# Patient Record
Sex: MALE | Race: WHITE | NOT HISPANIC OR LATINO | Employment: FULL TIME | URBAN - METROPOLITAN AREA
[De-identification: names, ages, dates, MRNs, and addresses within clinical notes are randomized per-mention and may not be internally consistent; named-entity substitution may affect disease eponyms.]

---

## 2023-01-29 PROBLEM — R06.09 DYSPNEA ON EXERTION: Status: ACTIVE | Noted: 2023-01-29

## 2023-01-29 PROBLEM — R07.9 INTERMITTENT CHEST PAIN: Status: ACTIVE | Noted: 2023-01-29

## 2023-01-29 PROBLEM — R40.0 DAYTIME SOMNOLENCE: Status: ACTIVE | Noted: 2023-01-29

## 2023-01-29 PROBLEM — F32.A DEPRESSION: Status: ACTIVE | Noted: 2023-01-29

## 2023-01-29 PROBLEM — K62.5 RECTAL BLEED: Status: ACTIVE | Noted: 2023-01-29

## 2023-01-29 PROBLEM — M79.10 GENERALIZED MUSCLE ACHE: Status: ACTIVE | Noted: 2023-01-29

## 2023-01-29 PROBLEM — E78.5 HYPERLIPIDEMIA: Status: ACTIVE | Noted: 2023-01-29

## 2023-01-29 PROBLEM — R45.851 SUICIDAL IDEATIONS: Status: ACTIVE | Noted: 2023-01-29

## 2023-01-29 RX ORDER — PHENYLPROPANOLAMINE/CLEMASTINE 75-1.34MG
TABLET, EXTENDED RELEASE ORAL
COMMUNITY
Start: 2022-05-09

## 2023-01-29 RX ORDER — ESCITALOPRAM OXALATE 20 MG/1
1 TABLET ORAL DAILY
COMMUNITY
Start: 2022-05-09 | End: 2023-04-13 | Stop reason: SDUPTHER

## 2023-03-13 ENCOUNTER — OFFICE VISIT (OUTPATIENT)
Dept: PRIMARY CARE | Facility: CLINIC | Age: 28
End: 2023-03-13
Payer: COMMERCIAL

## 2023-03-13 VITALS
SYSTOLIC BLOOD PRESSURE: 122 MMHG | WEIGHT: 201 LBS | OXYGEN SATURATION: 97 % | HEIGHT: 68 IN | DIASTOLIC BLOOD PRESSURE: 80 MMHG | BODY MASS INDEX: 30.46 KG/M2 | HEART RATE: 65 BPM

## 2023-03-13 DIAGNOSIS — Z30.09 VASECTOMY EVALUATION: Primary | ICD-10-CM

## 2023-03-13 DIAGNOSIS — Z00.00 HEALTHCARE MAINTENANCE: ICD-10-CM

## 2023-03-13 DIAGNOSIS — F32.A DEPRESSION, UNSPECIFIED DEPRESSION TYPE: ICD-10-CM

## 2023-03-13 PROCEDURE — 99214 OFFICE O/P EST MOD 30 MIN: CPT | Performed by: INTERNAL MEDICINE

## 2023-03-13 RX ORDER — HYDROCORTISONE 25 MG/G
CREAM TOPICAL
COMMUNITY
Start: 2022-05-09 | End: 2023-11-10 | Stop reason: SDUPTHER

## 2023-03-13 RX ORDER — HYDROCORTISONE ACETATE 25 MG/1
SUPPOSITORY RECTAL
COMMUNITY
Start: 2022-08-08 | End: 2023-10-12 | Stop reason: SDUPTHER

## 2023-03-13 NOTE — PROGRESS NOTES
"Subjective   Patient ID: Akira Matthews is a 28 y.o. male who presents for Follow-up.  HPI  Patient states he overall feels well no active issues or complaints he states the Lexapro 20 is actually doing very well for him he feels great no side effects he is very happy with this medicine              Health Maintenance:      Colonoscopy:      Mammogram:      Pelvic/Pap:      Low dose chest CT:      Aorta duplex:      Optho:      Podiatry:        Vaccines:      Prevnar 20:      Prevnar 13:      Pneumovax 23:      Tdap:      Shingrix:      COVID:      Influenza:        ROS:      General: denies fever/chills/weight loss      Head: denies HA/trauma/masses/dizziness      Eyes: denies vision change/loss of vision/blurry vision/diplopia/eye pain      Ears: denies hearing loss/tinnitus/otalgia/otorrhea      Nose: denies nasal drainage/anosmia      Throat: denies dysphagia/odynophagia      Lymphatics: denies lymph node swelling      Cardiac: denies CP/palpitations/orthopnea/PND      Pulmonary: denies dyspnea/cough/wheezing      GI: denies abd pain/n/v/diarrhea/melena/hematochezia/hematemesis      : Would like a vasectomy does not want kids with his wife denies dysuria/hematuria/change frequency      Genital: denies genital discharge/lesions      Skin: denies rashes/lesions/masses      MSK: denies weakness/swelling/edema/gait imbalance/pain      Neuro: denies paresthesias/seizures/dysarthria      Psych: denies depression/anxiety/suicidal or homicidal ideations            Objective   /80   Pulse 65   Ht 1.727 m (5' 8\")   Wt 91.2 kg (201 lb)   SpO2 97%   BMI 30.56 kg/m²      Physical Exam:     General: AO3, NAD     Head: atraumatic/NC     Eyes: EOMI/PERRLA. Negative APD     Ears: TM pearly gray, EAC clear. No lesions or erythema     Nose: symmetric nares, no discharge     Throat: trachea midline, uvula midline pink mucosa. No thyromegaly     Lymphatics: no cervical/supraclavicular/ant or posterior cervical " adenopathy/axillary/inguinal adenopathy     Breast: not examined     Chest: no deformity or tenderness to palpation     Pulm: CTA b/l, no wheeze/rhonchi/rales. nonlabored     Cardiac: RRR +s1s2, no m/r/g.      GI: soft, NT/ND. Normoactive Bsx4. No rebound/guarding.     Rectal: no examined     MSK: 5/5 strength UE LE. No edema/clubbing/cyanosis     Skin: no rashes/lesions     Vascular: 2+ palp DP PT radials b/l. Negative carotid bruit     Neuro: CNII-XII intact. No focal deficits. Reflexes 2/4 brachioradialis bicep tricep patellar achilles. Finger to nose intact.     Psych: appropriate mood/affect                    No results found for: BMPR1A, CBCDIF      Assessment/Plan   Diagnoses and all orders for this visit:  Vasectomy evaluation  -     Referral to Urology; Future  Healthcare maintenance  -     CBC and Auto Differential; Future  -     Comprehensive Metabolic Panel; Future  -     Hemoglobin A1C; Future  -     Lipid Panel; Future  -     T4, free; Future  -     TSH; Future  Depression, unspecified depression type    Thank you for making appointment today Akira Weldon follow-up in 6 months       Grace Haywood MA

## 2023-04-13 DIAGNOSIS — F32.A DEPRESSION, UNSPECIFIED DEPRESSION TYPE: Primary | ICD-10-CM

## 2023-04-13 RX ORDER — ESCITALOPRAM OXALATE 20 MG/1
20 TABLET ORAL DAILY
Qty: 90 TABLET | Refills: 1 | Status: SHIPPED | OUTPATIENT
Start: 2023-04-13 | End: 2023-04-27

## 2023-04-27 DIAGNOSIS — F32.A DEPRESSION, UNSPECIFIED DEPRESSION TYPE: ICD-10-CM

## 2023-04-27 RX ORDER — ESCITALOPRAM OXALATE 20 MG/1
TABLET ORAL
Qty: 90 TABLET | Refills: 1 | Status: SHIPPED | OUTPATIENT
Start: 2023-04-27 | End: 2023-10-12 | Stop reason: SDUPTHER

## 2023-07-19 LAB
ALANINE AMINOTRANSFERASE (SGPT) (U/L) IN SER/PLAS: 44 U/L (ref 10–52)
ALBUMIN (G/DL) IN SER/PLAS: 4.6 G/DL (ref 3.4–5)
ALKALINE PHOSPHATASE (U/L) IN SER/PLAS: 81 U/L (ref 33–120)
ANION GAP IN SER/PLAS: 14 MMOL/L (ref 10–20)
ASPARTATE AMINOTRANSFERASE (SGOT) (U/L) IN SER/PLAS: 24 U/L (ref 9–39)
BILIRUBIN TOTAL (MG/DL) IN SER/PLAS: 1.1 MG/DL (ref 0–1.2)
C REACTIVE PROTEIN (MG/L) IN SER/PLAS: 0.28 MG/DL
CALCIUM (MG/DL) IN SER/PLAS: 10.1 MG/DL (ref 8.6–10.3)
CARBON DIOXIDE, TOTAL (MMOL/L) IN SER/PLAS: 22 MMOL/L (ref 21–32)
CHLORIDE (MMOL/L) IN SER/PLAS: 105 MMOL/L (ref 98–107)
CREATININE (MG/DL) IN SER/PLAS: 0.78 MG/DL (ref 0.5–1.3)
ERYTHROCYTE DISTRIBUTION WIDTH (RATIO) BY AUTOMATED COUNT: 12.6 % (ref 11.5–14.5)
ERYTHROCYTE MEAN CORPUSCULAR HEMOGLOBIN CONCENTRATION (G/DL) BY AUTOMATED: 35.1 G/DL (ref 32–36)
ERYTHROCYTE MEAN CORPUSCULAR VOLUME (FL) BY AUTOMATED COUNT: 90 FL (ref 80–100)
ERYTHROCYTES (10*6/UL) IN BLOOD BY AUTOMATED COUNT: 5.23 X10E12/L (ref 4.5–5.9)
GFR MALE: >90 ML/MIN/1.73M2
GLUCOSE (MG/DL) IN SER/PLAS: 102 MG/DL (ref 74–99)
HEMATOCRIT (%) IN BLOOD BY AUTOMATED COUNT: 47 % (ref 41–52)
HEMOGLOBIN (G/DL) IN BLOOD: 16.5 G/DL (ref 13.5–17.5)
LEUKOCYTES (10*3/UL) IN BLOOD BY AUTOMATED COUNT: 7 X10E9/L (ref 4.4–11.3)
PLATELETS (10*3/UL) IN BLOOD AUTOMATED COUNT: 298 X10E9/L (ref 150–450)
POTASSIUM (MMOL/L) IN SER/PLAS: 3.6 MMOL/L (ref 3.5–5.3)
PROTEIN TOTAL: 7.5 G/DL (ref 6.4–8.2)
SODIUM (MMOL/L) IN SER/PLAS: 137 MMOL/L (ref 136–145)
THYROTROPIN (MIU/L) IN SER/PLAS BY DETECTION LIMIT <= 0.05 MIU/L: 1.15 MIU/L (ref 0.44–3.98)
UREA NITROGEN (MG/DL) IN SER/PLAS: 10 MG/DL (ref 6–23)

## 2023-07-20 LAB
HEPATITIS A TOTAL AB INTERPRETATION: REACTIVE
HEPATITIS B VIRUS CORE AB (PRESENCE) IN SER/PLAS BY IMM: NONREACTIVE
HEPATITIS B VIRUS SURFACE AB (MIU/ML) IN SERUM: <3.1 MIU/ML
HEPATITIS B VIRUS SURFACE AG PRESENCE IN SERUM: NONREACTIVE
HEPATITIS C VIRUS AB PRESENCE IN SERUM: NONREACTIVE
TISSUE TRANSGLUTAMINASE, IGA: <1 U/ML (ref 0–14)

## 2023-07-24 LAB
C. DIFFICILE TOXIN, PCR: NORMAL
CLOSTRIDIUM DIFFICILE NAP 1 STRAIN (PRESUMPTIVE): NORMAL

## 2023-07-25 LAB
CAMPYLOBACTER GP: NOT DETECTED
NOROVIRUS GI/GII: NOT DETECTED
ROTAVIRUS A: NOT DETECTED
SALMONELLA SP.: NOT DETECTED
SHIGA TOXIN 1: NOT DETECTED
SHIGA TOXIN 2: NOT DETECTED
SHIGELLA SP.: NOT DETECTED
VIBRIO GRP.: NOT DETECTED
YERSINIA ENTEROCOLITICA: NOT DETECTED

## 2023-07-27 LAB — CALPROTECTIN, STOOL: 48 UG/G

## 2023-07-31 LAB
CRYPTOSPORIDIUM ANTIGEN-DATA CONVERSION: NEGATIVE
GIARDIA LAMBLIA AG-DATA CONVERSION: NEGATIVE
OVA + PARASITE EXAM: NEGATIVE

## 2023-09-18 ENCOUNTER — APPOINTMENT (OUTPATIENT)
Dept: PRIMARY CARE | Facility: CLINIC | Age: 28
End: 2023-09-18
Payer: COMMERCIAL

## 2023-10-12 DIAGNOSIS — F32.A DEPRESSION, UNSPECIFIED DEPRESSION TYPE: ICD-10-CM

## 2023-10-12 DIAGNOSIS — K64.9 HEMORRHOIDS, UNSPECIFIED HEMORRHOID TYPE: Primary | ICD-10-CM

## 2023-10-16 RX ORDER — HYDROCORTISONE ACETATE 25 MG/1
SUPPOSITORY RECTAL
Qty: 12 SUPPOSITORY | Refills: 0 | Status: SHIPPED | OUTPATIENT
Start: 2023-10-16

## 2023-10-16 RX ORDER — ESCITALOPRAM OXALATE 20 MG/1
20 TABLET ORAL DAILY
Qty: 30 TABLET | Refills: 0 | Status: SHIPPED | OUTPATIENT
Start: 2023-10-16 | End: 2023-11-10 | Stop reason: SDUPTHER

## 2023-11-09 PROBLEM — Z98.52 STATUS POST VASECTOMY: Status: RESOLVED | Noted: 2023-11-09 | Resolved: 2023-11-09

## 2023-11-09 PROBLEM — K52.9 CHRONIC DIARRHEA: Status: ACTIVE | Noted: 2023-11-09

## 2023-11-10 ENCOUNTER — LAB (OUTPATIENT)
Dept: LAB | Facility: LAB | Age: 28
End: 2023-11-10
Payer: COMMERCIAL

## 2023-11-10 ENCOUNTER — OFFICE VISIT (OUTPATIENT)
Dept: PRIMARY CARE | Facility: CLINIC | Age: 28
End: 2023-11-10
Payer: COMMERCIAL

## 2023-11-10 VITALS
DIASTOLIC BLOOD PRESSURE: 86 MMHG | SYSTOLIC BLOOD PRESSURE: 120 MMHG | BODY MASS INDEX: 30.62 KG/M2 | WEIGHT: 202 LBS | HEIGHT: 68 IN

## 2023-11-10 DIAGNOSIS — D23.5 DERMOID CYST OF SKIN OF BACK: ICD-10-CM

## 2023-11-10 DIAGNOSIS — R19.5 LOOSE STOOLS: ICD-10-CM

## 2023-11-10 DIAGNOSIS — Z00.00 HEALTHCARE MAINTENANCE: ICD-10-CM

## 2023-11-10 DIAGNOSIS — S02.5XXS CLOSED FRACTURE OF TOOTH, SEQUELA: ICD-10-CM

## 2023-11-10 DIAGNOSIS — Z00.00 HEALTHCARE MAINTENANCE: Primary | ICD-10-CM

## 2023-11-10 DIAGNOSIS — F32.A DEPRESSION, UNSPECIFIED DEPRESSION TYPE: ICD-10-CM

## 2023-11-10 LAB
ALBUMIN SERPL BCP-MCNC: 4.7 G/DL (ref 3.4–5)
ALP SERPL-CCNC: 92 U/L (ref 33–120)
ALT SERPL W P-5'-P-CCNC: 58 U/L (ref 10–52)
ANION GAP SERPL CALC-SCNC: 13 MMOL/L (ref 10–20)
AST SERPL W P-5'-P-CCNC: 29 U/L (ref 9–39)
BASOPHILS # BLD AUTO: 0.05 X10*3/UL (ref 0–0.1)
BASOPHILS NFR BLD AUTO: 0.6 %
BILIRUB SERPL-MCNC: 0.9 MG/DL (ref 0–1.2)
BUN SERPL-MCNC: 12 MG/DL (ref 6–23)
CALCIUM SERPL-MCNC: 10.5 MG/DL (ref 8.6–10.6)
CHLORIDE SERPL-SCNC: 103 MMOL/L (ref 98–107)
CHOLEST SERPL-MCNC: 216 MG/DL (ref 0–199)
CHOLESTEROL/HDL RATIO: 5.1
CO2 SERPL-SCNC: 25 MMOL/L (ref 21–32)
CREAT SERPL-MCNC: 0.89 MG/DL (ref 0.5–1.3)
EOSINOPHIL # BLD AUTO: 0.23 X10*3/UL (ref 0–0.7)
EOSINOPHIL NFR BLD AUTO: 2.6 %
ERYTHROCYTE [DISTWIDTH] IN BLOOD BY AUTOMATED COUNT: 12.8 % (ref 11.5–14.5)
EST. AVERAGE GLUCOSE BLD GHB EST-MCNC: 94 MG/DL
GFR SERPL CREATININE-BSD FRML MDRD: >90 ML/MIN/1.73M*2
GLUCOSE SERPL-MCNC: 85 MG/DL (ref 74–99)
HBA1C MFR BLD: 4.9 %
HCT VFR BLD AUTO: 47.6 % (ref 41–52)
HDLC SERPL-MCNC: 42.4 MG/DL
HGB BLD-MCNC: 17.1 G/DL (ref 13.5–17.5)
IMM GRANULOCYTES # BLD AUTO: 0.05 X10*3/UL (ref 0–0.7)
IMM GRANULOCYTES NFR BLD AUTO: 0.6 % (ref 0–0.9)
LDLC SERPL CALC-MCNC: 118 MG/DL
LYMPHOCYTES # BLD AUTO: 2.78 X10*3/UL (ref 1.2–4.8)
LYMPHOCYTES NFR BLD AUTO: 31.4 %
MCH RBC QN AUTO: 32.2 PG (ref 26–34)
MCHC RBC AUTO-ENTMCNC: 35.9 G/DL (ref 32–36)
MCV RBC AUTO: 90 FL (ref 80–100)
MONOCYTES # BLD AUTO: 0.63 X10*3/UL (ref 0.1–1)
MONOCYTES NFR BLD AUTO: 7.1 %
NEUTROPHILS # BLD AUTO: 5.1 X10*3/UL (ref 1.2–7.7)
NEUTROPHILS NFR BLD AUTO: 57.7 %
NON HDL CHOLESTEROL: 174 MG/DL (ref 0–149)
NRBC BLD-RTO: 0 /100 WBCS (ref 0–0)
PLATELET # BLD AUTO: 327 X10*3/UL (ref 150–450)
POTASSIUM SERPL-SCNC: 4 MMOL/L (ref 3.5–5.3)
PROT SERPL-MCNC: 7.5 G/DL (ref 6.4–8.2)
RBC # BLD AUTO: 5.31 X10*6/UL (ref 4.5–5.9)
SODIUM SERPL-SCNC: 137 MMOL/L (ref 136–145)
T4 FREE SERPL-MCNC: 1.16 NG/DL (ref 0.78–1.48)
TRIGL SERPL-MCNC: 278 MG/DL (ref 0–149)
TSH SERPL-ACNC: 0.91 MIU/L (ref 0.44–3.98)
VLDL: 56 MG/DL (ref 0–40)
WBC # BLD AUTO: 8.8 X10*3/UL (ref 4.4–11.3)

## 2023-11-10 PROCEDURE — 84443 ASSAY THYROID STIM HORMONE: CPT

## 2023-11-10 PROCEDURE — 80061 LIPID PANEL: CPT

## 2023-11-10 PROCEDURE — 84439 ASSAY OF FREE THYROXINE: CPT

## 2023-11-10 PROCEDURE — 80053 COMPREHEN METABOLIC PANEL: CPT

## 2023-11-10 PROCEDURE — 36415 COLL VENOUS BLD VENIPUNCTURE: CPT

## 2023-11-10 PROCEDURE — 99215 OFFICE O/P EST HI 40 MIN: CPT | Performed by: INTERNAL MEDICINE

## 2023-11-10 PROCEDURE — 83036 HEMOGLOBIN GLYCOSYLATED A1C: CPT

## 2023-11-10 PROCEDURE — 85025 COMPLETE CBC W/AUTO DIFF WBC: CPT

## 2023-11-10 RX ORDER — DICYCLOMINE HYDROCHLORIDE 10 MG/1
10 CAPSULE ORAL DAILY PRN
Qty: 90 CAPSULE | Refills: 1 | Status: SHIPPED | OUTPATIENT
Start: 2023-11-10 | End: 2024-02-08

## 2023-11-10 RX ORDER — METRONIDAZOLE 500 MG/1
500 TABLET ORAL 3 TIMES DAILY
Qty: 21 TABLET | Refills: 2 | Status: SHIPPED | OUTPATIENT
Start: 2023-11-10 | End: 2023-11-17

## 2023-11-10 RX ORDER — ESCITALOPRAM OXALATE 20 MG/1
20 TABLET ORAL DAILY
Qty: 90 TABLET | Refills: 1 | Status: SHIPPED | OUTPATIENT
Start: 2023-11-10 | End: 2024-05-03 | Stop reason: SDUPTHER

## 2023-11-10 RX ORDER — AMOXICILLIN AND CLAVULANATE POTASSIUM 500; 125 MG/1; MG/1
500 TABLET, FILM COATED ORAL 2 TIMES DAILY
Qty: 14 TABLET | Refills: 2 | Status: SHIPPED | OUTPATIENT
Start: 2023-11-10 | End: 2023-11-17

## 2023-11-10 NOTE — PROGRESS NOTES
Subjective   Patient ID: Akira Matthews is a 28 y.o. male who presents for Follow-up and Med Refill.  HPI        past medical history of depression with suicidal ideation, vasectomy hemorrhoids, rectal bleeding internal hemorrhoids, genital herpes, heavy alcohol use, tobacco dependence,generalized muscle ache, and ADHD. Polysubstance abuse methamphetamine heroin     Patient presents with chronic loose stools for 9 months loose browngrade 8 worse since she has been working   area for the last 9 months nothing really makes better  Intermittent hematochezia from a large internal hemorrhoid he states  Denies abdominal pain nausea vomiting fever chills hematemesis  Denies recent travel sick contacts          Health Maintenance:      Colonoscopy: 2023      Mammogram:      Pelvic/Pap:      Low dose chest CT:      Aorta duplex:      Optho:      Podiatry:        Vaccines:      Prevnar 20:      Prevnar 13:      Pneumovax 23:      Tdap:      Shingrix:      COVID:      Influenza:        ROS:      General: denies fever/chills/weight loss      Head: denies HA/trauma/masses/dizziness      Eyes: denies vision change/loss of vision/blurry vision/diplopia/eye pain      Ears: denies hearing loss/tinnitus/otalgia/otorrhea      Nose: denies nasal drainage/anosmia      Throat: Fractured left tooth denies dysphagia/odynophagia      Lymphatics: denies lymph node swelling      Cardiac: denies CP/palpitations/orthopnea/PND      Pulmonary: denies dyspnea/cough/wheezing      GI: Chronic loose brown stools for 9 months denies abd pain/n/v/diarrhea/melena/hematochezia/hematemesis      : denies dysuria/hematuria/change frequency      Genital: denies genital discharge/lesions      Skin: He has a lump on his back since age 12 sometimes pus drains out of it denies rashes/lesions/masses      MSK: denies weakness/swelling/edema/gait imbalance/pain      Neuro: denies paresthesias/seizures/dysarthria      Psych: Some stress because his wife is  "leaving him but he states the Lexapro really helps him 20 mg a day denies depression/anxiety/suicidal or homicidal ideations            Objective   /86   Ht 1.727 m (5' 8\")   Wt 91.6 kg (202 lb)   BMI 30.71 kg/m²      Physical Exam:     General: AO3, NAD     Head: atraumatic/NC     Eyes: EOMI/PERRLA. Negative APD     Ears: TM pearly gray, EAC clear. No lesions or erythema     Nose: symmetric nares, no discharge     Throat: trachea midline, uvula midline pink mucosa. No thyromegaly     Lymphatics: no cervical/supraclavicular/ant or posterior cervical adenopathy/axillary/inguinal adenopathy     Breast: not examined     Chest: no deformity or tenderness to palpation     Pulm: CTA b/l, no wheeze/rhonchi/rales. nonlabored     Cardiac: RRR +s1s2, no m/r/g.      GI: soft, NT/ND. Normoactive Bsx4. No rebound/guarding.     Rectal: no examined     MSK: 5/5 strength UE LE. No edema/clubbing/cyanosis     Skin: Posterior left parathoracic cystic subcutaneous mass 3 cm no fluctuance tenderness drainage warmth no rashes/lesions     Vascular: 2+ palp DP PT radials b/l. Negative carotid bruit     Neuro: CNII-XII intact. No focal deficits. Reflexes 2/4 brachioradialis bicep tricep patellar achilles. Finger to nose intact.     Psych: appropriate mood/affect                    No results found for: \"BMPR1A\", \"CBCDIF\"      Assessment/Plan   Diagnoses and all orders for this visit:  Healthcare maintenance  -     CBC and Auto Differential; Future  -     Comprehensive Metabolic Panel; Future  -     Hemoglobin A1C; Future  -     Lipid Panel; Future  -     TSH with reflex to Free T4 if abnormal; Future  -     Thyroxine, Free; Future  Depression, unspecified depression type  -     escitalopram (Lexapro) 20 mg tablet; Take 1 tablet (20 mg) by mouth once daily.  Loose stools  Comments:  Likely IBS-D rule out other such as bacterial gastroenteritis ova parasite given exposure in the   Orders:  -     dicyclomine (Bentyl) 10 mg " capsule; Take 1 capsule (10 mg) by mouth once daily as needed (loose stools).  -     amoxicillin-pot clavulanate (Augmentin) 500-125 mg tablet; Take 1 tablet (500 mg) by mouth 2 times a day for 7 days.  -     metroNIDAZOLE (Flagyl) 500 mg tablet; Take 1 tablet (500 mg) by mouth 3 times a day for 7 days.  Dermoid cyst of skin of back  Comments:  With intermittent infection  Orders:  -     Referral to General Surgery; Future  Closed fracture of tooth, sequela  -     Referral to Dentistry; Future    Call and follow-up with GI    Thank you for making from today Akira    Please follow-up 6 months       Grace Haywood MA

## 2023-11-30 DIAGNOSIS — R74.01 ELEVATED ALT MEASUREMENT: Primary | ICD-10-CM

## 2024-02-01 ENCOUNTER — PATIENT MESSAGE (OUTPATIENT)
Dept: SURGERY | Facility: CLINIC | Age: 29
End: 2024-02-01
Payer: COMMERCIAL

## 2024-02-27 DIAGNOSIS — K62.89 ANAL PAIN: Primary | ICD-10-CM

## 2024-03-15 ENCOUNTER — OFFICE VISIT (OUTPATIENT)
Dept: SURGERY | Facility: CLINIC | Age: 29
End: 2024-03-15
Payer: COMMERCIAL

## 2024-03-15 VITALS
HEART RATE: 74 BPM | SYSTOLIC BLOOD PRESSURE: 122 MMHG | BODY MASS INDEX: 31.52 KG/M2 | HEIGHT: 68 IN | TEMPERATURE: 98.2 F | WEIGHT: 208 LBS | DIASTOLIC BLOOD PRESSURE: 82 MMHG

## 2024-03-15 DIAGNOSIS — K52.9 CHRONIC DIARRHEA: Primary | ICD-10-CM

## 2024-03-15 DIAGNOSIS — K62.89 ANAL PAIN: ICD-10-CM

## 2024-03-15 PROCEDURE — 99212 OFFICE O/P EST SF 10 MIN: CPT | Performed by: NURSE PRACTITIONER

## 2024-03-15 NOTE — PROGRESS NOTES
Chief complaint:  Anal fissure    History of present illness:  Akira is a 29 M with h/o anal fissure that started about 1.5 years ago. Intermittent problems over the past 1.5 years. Has tried OTC products. Symptoms seem to resolve when he is on Metamucil.   About 1 month ago he started having anal pain again. He restarted the Metamucil and the pain has pretty much resolved. Intermittently has BRBPR when he is having the pain.    BM without Metamucil 7-8 times daily. [place]. [place] stool is a type 6.  With Metamucil 1-2 times. Stools are formed    Denies any anal sex, anal trauma, or anal surgeries.    PMH: Depression, diarrhea  PSH:  Family history:  Tobacco use:  Alcohol use: 4 beers every other day or so  Recreation drugs: clean from methamphetamines for 6    Colonoscopy:  9/25/23 Normal    Review of Systems       Constitutional: Negative for fever, chills, anorexia, weight loss, malaise   ENMT: Negative for nasal discharge, congestion, ear pain, mouth pain, throat pain  Respiratory: Negative for cough, hemoptysis, wheezing, shortness of breath  Cardiac: Negative for chest pain, dyspnea on exertion, orthopnea, palpitations, syncope  Gastrointestinal: Negative for nausea, vomiting, diarrhea, constipation, abdominal pain  Genitourinary: Negative for discharge, dysuria, flank pain, frequency, hematuria  Musculoskeletal: Negative for decreased ROM, pain, swelling, weakness   Neurological: Negative for dizziness, confusion, headache, seizures, syncope   Psychiatric: Negative for mood changes, anxiety, hallucinations, sleep changes, suicidal ideas. + Depression controlled on Lexapro  Skin: Negative for mass, pain, itching, rash, ulcer   Endocrine: Negative for heat intolerance, cold intolerance, excessive sweating, polyuria, excess thirst   Hematologic/Lymph: Negative for anemia, bruising, easy bleeding, night sweats, petechiae, history of DVT/PE or cancer   Allergic/Immunologic: Negative for anaphylaxis, itchy/ teary  eyes, itching, sneezing, swelling'    Physical Exam   Constitutional: Well developed, awake/alert/oriented x3, no distress, alert and cooperative   Eyes: Sclera anicteric, no conjunctival inflammation, conjugate gaze   ENMT: mucous membranes moist, no apparent injury,   Head/Neck: Neck supple, no apparent injury, trachea midline, no bruits   Neurological: alert and oriented x3, normal strength, Normal gait   Psychological: Appropriate mood and behavior   Skin: Warm and dry, no lesions, no rashes   Anorectal: External hemorrhoid Left lateral, healed posterior fissure    A chaperone was present during the exam, [employer] student.    Assessment:  Chronic diarrhea controlled with Metamucil  Healed anal fissure  External hemorrhoids    Plan:   Increase fluid intake: aim for 8-10, 8 oz glasses of water daily. Other beverages, such as juice, coffee, or tea may count toward this goal.  -Increase high fiber foods: fruits , vegetables, whole grains, and beans. Aim for 25-35 grams per day.  -Add a powder fiber supplement daily. Like Metamucil, Citrucel, Konsyl or Benefiber daily-follow instructions on the bottle.  -Avoiding sitting on the toilet for prolonged periods of time  -Avoid straining  -Will call if wants Nifedipine 0.2% ointment  -Follow up with me PRN

## 2024-03-15 NOTE — PATIENT INSTRUCTIONS
-Increase fluid intake: aim for 8-10, 8 oz glasses of water daily. Other beverages, such as juice, coffee, or tea may count toward this goal.  -Increase high fiber foods: fruits , vegetables, whole grains, and beans. Aim for 25-35 grams per day.  -Add a powder fiber supplement daily. Like Metamucil, Citrucel, Konsyl or Benefiber daily-follow instructions on the bottle.  -Avoiding sitting on the toilet for prolonged periods of time  -Avoid straining  -Will call if wants Nifedipine 0.2% ointment  -Follow up with me PRN

## 2024-03-21 ENCOUNTER — OFFICE VISIT (OUTPATIENT)
Dept: URGENT CARE | Facility: CLINIC | Age: 29
End: 2024-03-21
Payer: COMMERCIAL

## 2024-03-21 VITALS
DIASTOLIC BLOOD PRESSURE: 85 MMHG | SYSTOLIC BLOOD PRESSURE: 121 MMHG | TEMPERATURE: 98.2 F | HEART RATE: 80 BPM | OXYGEN SATURATION: 98 % | HEIGHT: 68 IN | BODY MASS INDEX: 31.37 KG/M2 | RESPIRATION RATE: 14 BRPM | WEIGHT: 207 LBS

## 2024-03-21 DIAGNOSIS — L72.9 INFECTED CYST OF SKIN: Primary | ICD-10-CM

## 2024-03-21 DIAGNOSIS — L08.9 INFECTED CYST OF SKIN: Primary | ICD-10-CM

## 2024-03-21 PROCEDURE — 99203 OFFICE O/P NEW LOW 30 MIN: CPT | Performed by: PHYSICIAN ASSISTANT

## 2024-03-21 PROCEDURE — 87205 SMEAR GRAM STAIN: CPT

## 2024-03-21 PROCEDURE — 87075 CULTR BACTERIA EXCEPT BLOOD: CPT

## 2024-03-21 PROCEDURE — 87070 CULTURE OTHR SPECIMN AEROBIC: CPT

## 2024-03-21 PROCEDURE — 10060 I&D ABSCESS SIMPLE/SINGLE: CPT | Performed by: PHYSICIAN ASSISTANT

## 2024-03-21 PROCEDURE — 87181 SC STD AGAR DILUTION PER AGT: CPT

## 2024-03-21 RX ORDER — PSYLLIUM HUSK 3.4 G/12G
1 POWDER ORAL DAILY
COMMUNITY

## 2024-03-21 RX ORDER — DOXYCYCLINE 100 MG/1
100 CAPSULE ORAL 2 TIMES DAILY
Qty: 20 CAPSULE | Refills: 0 | Status: SHIPPED | OUTPATIENT
Start: 2024-03-21 | End: 2024-03-31

## 2024-03-21 ASSESSMENT — PAIN SCALES - GENERAL: PAINLEVEL: 5

## 2024-03-22 NOTE — PATIENT INSTRUCTIONS
Assessment/Plan   Problem List Items Addressed This Visit       Infected cyst of skin - Primary    Relevant Medications    doxycycline (Vibramycin) 100 mg capsule    Other Relevant Orders    Referral to Dermatology    Tissue/Wound Culture/Smear

## 2024-03-22 NOTE — PROGRESS NOTES
"Subjective   Patient ID: Akira Matthews is a 29 y.o. male who presents for Abscess (Left mid-back abscess x1 week. No drainage.).  Patient notes that he had cyst in the left thoracic back for years.  Has had it drained at home 1 time before.  He has never been seen by dermatology for this.  Notes that its gotten bigger and red and painful over the course the last few days and is concerned that it needs to be drained again.    Past Medical History:   Diagnosis Date    Personal history of other infectious and parasitic diseases     History of herpes genitalis    Personal history of other mental and behavioral disorders     History of depression         The remainder of the systems were reviewed and are negative unless noted above      Objective   /85   Pulse 80   Temp 36.8 °C (98.2 °F) (Temporal)   Resp 14   Ht 1.727 m (5' 8\")   Wt 93.9 kg (207 lb)   SpO2 98%   BMI 31.47 kg/m²   Physical Exam  Constitutional:       General: He is not in acute distress.     Appearance: Normal appearance. He is not ill-appearing, toxic-appearing or diaphoretic.   HENT:      Head: Normocephalic and atraumatic.      Mouth/Throat:      Mouth: Mucous membranes are moist.      Pharynx: Oropharynx is clear.   Eyes:      Conjunctiva/sclera: Conjunctivae normal.   Cardiovascular:      Rate and Rhythm: Normal rate and regular rhythm.      Heart sounds: No murmur heard.  Pulmonary:      Effort: Pulmonary effort is normal. No respiratory distress.      Breath sounds: Normal breath sounds. No wheezing.   Musculoskeletal:         General: No swelling, tenderness, deformity or signs of injury. Normal range of motion.      Cervical back: Normal range of motion and neck supple.   Skin:     General: Skin is warm and dry.      Findings: Erythema present. No rash.      Comments: Fluctuant cystic abscess to the left thoracic back with surrounding erythema   Neurological:      General: No focal deficit present.      Mental Status: He is alert and " oriented to person, place, and time.      Gait: Gait normal.           Patient ID: Akira Matthews is a 29 y.o. male.    Incision and Drainage    Date/Time: 3/21/2024 8:19 PM    Performed by: Romulo Guerra PA-C  Authorized by: Romulo Guerra PA-C    Consent:     Consent obtained:  Verbal    Consent given by:  Patient    Risks discussed:  Bleeding, incomplete drainage, pain and infection    Alternatives discussed:  No treatment and alternative treatment  Universal protocol:     Patient identity confirmed:  Verbally with patient  Location:     Type:  Cyst    Location:  Trunk    Trunk location:  Back  Pre-procedure details:     Skin preparation:  Povidone-iodine  Sedation:     Sedation type:  None  Anesthesia:     Anesthesia method:  Local infiltration    Local anesthetic:  Lidocaine 1% w/o epi  Procedure type:     Complexity:  Simple  Procedure details:     Ultrasound guidance: no      Needle aspiration: no      Incision types:  Single straight    Incision depth:  Subcutaneous    Wound management:  Probed and deloculated    Drainage:  Purulent and bloody    Drainage amount:  Copious    Wound treatment:  Wound left open  Post-procedure details:     Procedure completion:  Tolerated    Assessment/Plan   Problem List Items Addressed This Visit       Infected cyst of skin - Primary    Relevant Medications    doxycycline (Vibramycin) 100 mg capsule    Other Relevant Orders    Referral to Dermatology    Tissue/Wound Culture/Smear   What appears to be subcutaneous cyst with secondary infection was incised and drained here in office.  I discussed with patient need for follow-up with dermatology for definitive removal.  The patient tolerated the procedure well and is to start doxycycline for coverage while we await the results of the wound culture      Addendum 3/27/2024 9:20 AM  Today I reviewed the sensitivity report, contacted the patient regarding the results of the report, starting the patient on Pen-V K over the course of  the next 10 days based upon the sensitivity report

## 2024-03-27 LAB
BACTERIA SPEC CULT: ABNORMAL
GRAM STN SPEC: ABNORMAL
GRAM STN SPEC: ABNORMAL

## 2024-03-27 RX ORDER — PENICILLIN V POTASSIUM 500 MG/1
500 TABLET, FILM COATED ORAL 4 TIMES DAILY
Qty: 40 TABLET | Refills: 0 | Status: SHIPPED | OUTPATIENT
Start: 2024-03-27 | End: 2024-04-06

## 2024-05-03 ENCOUNTER — OFFICE VISIT (OUTPATIENT)
Dept: PRIMARY CARE | Facility: CLINIC | Age: 29
End: 2024-05-03
Payer: COMMERCIAL

## 2024-05-03 VITALS
DIASTOLIC BLOOD PRESSURE: 88 MMHG | SYSTOLIC BLOOD PRESSURE: 130 MMHG | BODY MASS INDEX: 31.52 KG/M2 | HEIGHT: 68 IN | WEIGHT: 208 LBS | OXYGEN SATURATION: 98 % | HEART RATE: 70 BPM

## 2024-05-03 DIAGNOSIS — F90.9 ATTENTION DEFICIT HYPERACTIVITY DISORDER (ADHD), UNSPECIFIED ADHD TYPE: ICD-10-CM

## 2024-05-03 DIAGNOSIS — H66.91 RIGHT OTITIS MEDIA, UNSPECIFIED OTITIS MEDIA TYPE: Primary | ICD-10-CM

## 2024-05-03 DIAGNOSIS — F32.A DEPRESSION, UNSPECIFIED DEPRESSION TYPE: ICD-10-CM

## 2024-05-03 DIAGNOSIS — L03.90 CELLULITIS, UNSPECIFIED CELLULITIS SITE: ICD-10-CM

## 2024-05-03 PROCEDURE — 99214 OFFICE O/P EST MOD 30 MIN: CPT | Performed by: INTERNAL MEDICINE

## 2024-05-03 RX ORDER — DOXYCYCLINE 100 MG/1
100 CAPSULE ORAL 2 TIMES DAILY
Qty: 14 CAPSULE | Refills: 2 | Status: SHIPPED | OUTPATIENT
Start: 2024-05-03 | End: 2024-05-10

## 2024-05-03 RX ORDER — CIPROFLOXACIN AND DEXAMETHASONE 3; 1 MG/ML; MG/ML
4 SUSPENSION/ DROPS AURICULAR (OTIC) 2 TIMES DAILY
Qty: 7.5 ML | Refills: 2 | Status: SHIPPED | OUTPATIENT
Start: 2024-05-03

## 2024-05-03 RX ORDER — ATOMOXETINE 18 MG/1
18 CAPSULE ORAL DAILY
Qty: 90 CAPSULE | Refills: 1 | Status: SHIPPED | OUTPATIENT
Start: 2024-05-03 | End: 2024-08-01

## 2024-05-03 RX ORDER — ESCITALOPRAM OXALATE 20 MG/1
20 TABLET ORAL DAILY
Qty: 90 TABLET | Refills: 1 | Status: SHIPPED | OUTPATIENT
Start: 2024-05-03

## 2024-05-03 ASSESSMENT — PATIENT HEALTH QUESTIONNAIRE - PHQ9
8. MOVING OR SPEAKING SO SLOWLY THAT OTHER PEOPLE COULD HAVE NOTICED. OR THE OPPOSITE, BEING SO FIGETY OR RESTLESS THAT YOU HAVE BEEN MOVING AROUND A LOT MORE THAN USUAL: NOT AT ALL
1. LITTLE INTEREST OR PLEASURE IN DOING THINGS: MORE THAN HALF THE DAYS
7. TROUBLE CONCENTRATING ON THINGS, SUCH AS READING THE NEWSPAPER OR WATCHING TELEVISION: NEARLY EVERY DAY
9. THOUGHTS THAT YOU WOULD BE BETTER OFF DEAD, OR OF HURTING YOURSELF: NEARLY EVERY DAY
SUM OF ALL RESPONSES TO PHQ QUESTIONS 1-9: 16
5. POOR APPETITE OR OVEREATING: MORE THAN HALF THE DAYS
SUM OF ALL RESPONSES TO PHQ9 QUESTIONS 1 AND 2: 4
4. FEELING TIRED OR HAVING LITTLE ENERGY: MORE THAN HALF THE DAYS
6. FEELING BAD ABOUT YOURSELF - OR THAT YOU ARE A FAILURE OR HAVE LET YOURSELF OR YOUR FAMILY DOWN: NOT AT ALL
2. FEELING DOWN, DEPRESSED OR HOPELESS: MORE THAN HALF THE DAYS
3. TROUBLE FALLING OR STAYING ASLEEP OR SLEEPING TOO MUCH: MORE THAN HALF THE DAYS
10. IF YOU CHECKED OFF ANY PROBLEMS, HOW DIFFICULT HAVE THESE PROBLEMS MADE IT FOR YOU TO DO YOUR WORK, TAKE CARE OF THINGS AT HOME, OR GET ALONG WITH OTHER PEOPLE: NOT DIFFICULT AT ALL
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
## 2024-05-10 ENCOUNTER — APPOINTMENT (OUTPATIENT)
Dept: PRIMARY CARE | Facility: CLINIC | Age: 29
End: 2024-05-10
Payer: COMMERCIAL

## 2024-07-22 ENCOUNTER — OFFICE VISIT (OUTPATIENT)
Dept: URGENT CARE | Facility: CLINIC | Age: 29
End: 2024-07-22
Payer: COMMERCIAL

## 2024-07-22 VITALS
DIASTOLIC BLOOD PRESSURE: 84 MMHG | OXYGEN SATURATION: 98 % | RESPIRATION RATE: 14 BRPM | TEMPERATURE: 97.4 F | HEART RATE: 95 BPM | SYSTOLIC BLOOD PRESSURE: 144 MMHG | BODY MASS INDEX: 31.07 KG/M2 | HEIGHT: 68 IN | WEIGHT: 205 LBS

## 2024-07-22 DIAGNOSIS — K64.9 HEMORRHOIDS, UNSPECIFIED HEMORRHOID TYPE: Primary | ICD-10-CM

## 2024-07-22 PROCEDURE — 99213 OFFICE O/P EST LOW 20 MIN: CPT | Performed by: PHYSICIAN ASSISTANT

## 2024-07-22 PROCEDURE — 3008F BODY MASS INDEX DOCD: CPT | Performed by: PHYSICIAN ASSISTANT

## 2024-07-22 RX ORDER — PRAMOXINE HYDROCHLORIDE HYDROCORTISONE ACETATE 100; 100 MG/10G; MG/10G
1 AEROSOL, FOAM TOPICAL 2 TIMES DAILY
Qty: 10 G | Refills: 0 | Status: SHIPPED | OUTPATIENT
Start: 2024-07-22 | End: 2024-07-27

## 2024-07-22 ASSESSMENT — PAIN SCALES - GENERAL: PAINLEVEL: 6

## 2024-07-22 NOTE — LETTER
July 22, 2024     Patient: Akira Matthews   YOB: 1995   Date of Visit: 7/22/2024       To Whom It May Concern:    It is my medical opinion that Akira Matthews may return to work on 29 July 2024 .    If you have any questions or concerns, please don't hesitate to call.         Sincerely,        Mac Tomas PA-C    CC: No Recipients

## 2024-07-23 ENCOUNTER — HOSPITAL ENCOUNTER (EMERGENCY)
Facility: HOSPITAL | Age: 29
Discharge: HOME | End: 2024-07-23
Attending: EMERGENCY MEDICINE
Payer: COMMERCIAL

## 2024-07-23 VITALS
OXYGEN SATURATION: 98 % | DIASTOLIC BLOOD PRESSURE: 93 MMHG | WEIGHT: 205 LBS | HEIGHT: 68 IN | SYSTOLIC BLOOD PRESSURE: 142 MMHG | HEART RATE: 86 BPM | RESPIRATION RATE: 17 BRPM | BODY MASS INDEX: 31.07 KG/M2 | TEMPERATURE: 97.5 F

## 2024-07-23 DIAGNOSIS — K64.9 HEMORRHOIDS, UNSPECIFIED HEMORRHOID TYPE: Primary | ICD-10-CM

## 2024-07-23 PROCEDURE — 2500000001 HC RX 250 WO HCPCS SELF ADMINISTERED DRUGS (ALT 637 FOR MEDICARE OP)

## 2024-07-23 PROCEDURE — 99283 EMERGENCY DEPT VISIT LOW MDM: CPT

## 2024-07-23 PROCEDURE — 99283 EMERGENCY DEPT VISIT LOW MDM: CPT | Performed by: EMERGENCY MEDICINE

## 2024-07-23 RX ORDER — HYDROCORTISONE ACETATE 25 MG/1
25 SUPPOSITORY RECTAL 2 TIMES DAILY
Qty: 12 SUPPOSITORY | Refills: 0 | Status: SHIPPED | OUTPATIENT
Start: 2024-07-23 | End: 2024-08-02

## 2024-07-23 RX ORDER — LIDOCAINE 40 MG/G
CREAM TOPICAL ONCE
Status: COMPLETED | OUTPATIENT
Start: 2024-07-23 | End: 2024-07-23

## 2024-07-23 RX ORDER — AMOXICILLIN AND CLAVULANATE POTASSIUM 875; 125 MG/1; MG/1
1 TABLET, FILM COATED ORAL EVERY 12 HOURS
Qty: 14 TABLET | Refills: 0 | Status: SHIPPED | OUTPATIENT
Start: 2024-07-23 | End: 2024-07-30

## 2024-07-23 RX ADMIN — LIDOCAINE 4% 1 APPLICATION: 4 CREAM TOPICAL at 20:40

## 2024-07-23 ASSESSMENT — PAIN DESCRIPTION - DESCRIPTORS: DESCRIPTORS: BURNING;THROBBING;STABBING

## 2024-07-23 ASSESSMENT — COLUMBIA-SUICIDE SEVERITY RATING SCALE - C-SSRS
1. IN THE PAST MONTH, HAVE YOU WISHED YOU WERE DEAD OR WISHED YOU COULD GO TO SLEEP AND NOT WAKE UP?: NO
2. HAVE YOU ACTUALLY HAD ANY THOUGHTS OF KILLING YOURSELF?: NO
6. HAVE YOU EVER DONE ANYTHING, STARTED TO DO ANYTHING, OR PREPARED TO DO ANYTHING TO END YOUR LIFE?: NO

## 2024-07-23 ASSESSMENT — PAIN SCALES - GENERAL
PAINLEVEL_OUTOF10: 0 - NO PAIN
PAINLEVEL_OUTOF10: 6

## 2024-07-23 ASSESSMENT — PAIN DESCRIPTION - PAIN TYPE: TYPE: ACUTE PAIN

## 2024-07-23 ASSESSMENT — PAIN DESCRIPTION - ONSET: ONSET: ONGOING

## 2024-07-23 ASSESSMENT — PAIN - FUNCTIONAL ASSESSMENT
PAIN_FUNCTIONAL_ASSESSMENT: 0-10
PAIN_FUNCTIONAL_ASSESSMENT: 0-10

## 2024-07-23 ASSESSMENT — PAIN DESCRIPTION - LOCATION: LOCATION: BUTTOCKS

## 2024-07-23 ASSESSMENT — PAIN DESCRIPTION - FREQUENCY: FREQUENCY: CONSTANT/CONTINUOUS

## 2024-07-23 ASSESSMENT — PAIN DESCRIPTION - PROGRESSION: CLINICAL_PROGRESSION: NOT CHANGED

## 2024-07-23 NOTE — PROGRESS NOTES
Subjective   Patient ID: Akira Matthews is a 29 y.o. male.    Patient is a 29-year-old male who complains of a worsening presumed hemorrhoid that he has been experiencing for the past 1 week.  Patient reports increasing pain over the past several days.  Patient states he has been applying over-the-counter hemorrhoid cream to the site with no improvement in his symptoms.  Patient denies overt bleeding or black stools.  Patient has no history of hypertension or other related conditions.  Patient reports that he is having normal bowel movements although it is painful to do so.      The following portions of the chart were reviewed this encounter and updated as appropriate:       Review of Systems   Gastrointestinal:         Anal Pain;  Possible Hemorrhoid   All other systems reviewed and are negative.  Objective   Physical Exam  Vitals and nursing note reviewed.   Constitutional:       Appearance: Normal appearance. He is normal weight.   Cardiovascular:      Pulses: Normal pulses.   Pulmonary:      Effort: Pulmonary effort is normal.      Breath sounds: Normal breath sounds.   Abdominal:      General: Abdomen is flat. Bowel sounds are normal.      Palpations: Abdomen is soft.   Genitourinary:     Rectum: Normal.      Comments: There is an approximately 2 cm firm external hemorrhoid noted to the left anus.  Skin is intact and clear with no erythema or ecchymosis.  Patient demonstrates significant tenderness with light palpation to the site.  There is no evidence of fissure or other wound to the perianal skin.  There is no bleeding noted.  No induration or fluctuance is noted to the perianal region.  Digital rectal exam was not attempted secondary to the patient's significant pain.  Skin:     General: Skin is warm and dry.      Capillary Refill: Capillary refill takes less than 2 seconds.   Neurological:      General: No focal deficit present.      Mental Status: He is alert and oriented to person, place, and time.    Psychiatric:         Mood and Affect: Mood normal.         Behavior: Behavior normal.         Thought Content: Thought content normal.         Judgment: Judgment normal.     Assessment/Plan   Physical exam findings as noted above.  Patient was provided with a prescription for Proctofoam HC rectal foam and clearly instructed to contact his primary care physician or report to an emergency department if he notes any acute worsening of his symptoms as no further management is possible at this urgent care facility.  Patient verbalizes good understanding of the above instructions.    CLINICAL IMPRESSION:  External Hemorrhoid    Diagnoses and all orders for this visit:  Hemorrhoids, unspecified hemorrhoid type  -     hydrocortisone-pramoxine (Proctofoam HC) rectal foam; Insert 1 applicator into the rectum 2 times a day for 5 days.    Patient disposition: Home

## 2024-07-24 NOTE — DISCHARGE INSTRUCTIONS
Dr. Naik is our colorectal surgeon you can follow up with as well to discuss hemorrhoid removal.  Return to the emergency room for any worsening pain, fevers and chills, bloody stool, or any worsening concerning symptoms otherwise please utilize the benzocaine spray, the Anusol HC suppositories, and the antibiotics as directed.

## 2024-07-24 NOTE — ED PROVIDER NOTES
HPI   Chief Complaint   Patient presents with    Rectal Pain     Hx of hemorrhoids, pt states rectal pain x1 week       Patient is a 29-year-old male with history of internal and external hemorrhoids.  Patient reports that recently his hemorrhoid pain has worsened significantly, to where he describes it 9 out of 10.  Patient has a very difficult to sit on his rear end without significant pain.  Patient reports that he was prescribed Proctofoam by doctor at urgent care, however having difficulty obtaining medication.  Patient denies any bleeding of his internal or external hemorrhoids, weakness, fever, chills, or any other worsening symptoms.              Patient History   Past Medical History:   Diagnosis Date    Personal history of other infectious and parasitic diseases     History of herpes genitalis    Personal history of other mental and behavioral disorders     History of depression     Past Surgical History:   Procedure Laterality Date    OTHER SURGICAL HISTORY  05/09/2022    [place] tooth extraction    OTHER SURGICAL HISTORY  05/09/2022    [place] tooth extraction    OTHER SURGICAL HISTORY  05/09/2022    Tooth extraction     Family History   Problem Relation Name Age of Onset    Diabetes Mother      Lung cancer Other grandfather     Obesity Other maternal relatives     Schizophrenia Other maternal relatives     Diabetes type II Other maternal relatives     Pancreatic cancer Other grandmother      Social History     Tobacco Use    Smoking status: Some Days     Types: Cigarettes, Cigars    Smokeless tobacco: Current    Tobacco comments:     VAPING   Vaping Use    Vaping status: Every Day    Substances: Nicotine    Devices: Disposable   Substance Use Topics    Alcohol use: Yes     Alcohol/week: 12.0 standard drinks of alcohol     Types: 12 Cans of beer per week    Drug use: Not Currently       Physical Exam   ED Triage Vitals [07/23/24 1934]   Temperature Heart Rate Respirations BP   36.4 °C (97.5 °F) 86 18 (!)  159/102      Pulse Ox Temp Source Heart Rate Source Patient Position   95 % Temporal Monitor Standing      BP Location FiO2 (%)     Right arm --       Physical Exam  Constitutional:       Appearance: Normal appearance. He is normal weight.   HENT:      Head: Normocephalic and atraumatic.      Nose: Nose normal.      Mouth/Throat:      Mouth: Mucous membranes are moist.      Pharynx: Oropharynx is clear.   Eyes:      Extraocular Movements: Extraocular movements intact.      Conjunctiva/sclera: Conjunctivae normal.      Pupils: Pupils are equal, round, and reactive to light.   Cardiovascular:      Rate and Rhythm: Normal rate and regular rhythm.      Pulses: Normal pulses.      Heart sounds: Normal heart sounds.   Pulmonary:      Effort: Pulmonary effort is normal.      Breath sounds: Normal breath sounds.   Abdominal:      General: Abdomen is flat. Bowel sounds are normal.      Palpations: Abdomen is soft.   Genitourinary:     Rectum: Normal.      Comments: External hemorrhoid, nonthrombosed at 12:00 and 6 o'clock position around anus.  Nurse chaperone Annmarie was present.  Musculoskeletal:         General: Normal range of motion.      Cervical back: Normal range of motion and neck supple.   Skin:     General: Skin is warm and dry.      Capillary Refill: Capillary refill takes less than 2 seconds.   Neurological:      General: No focal deficit present.      Mental Status: He is alert and oriented to person, place, and time. Mental status is at baseline.   Psychiatric:         Mood and Affect: Mood normal.         Behavior: Behavior normal.           ED Course & MDM   Diagnoses as of 07/29/24 2348   Hemorrhoids, unspecified hemorrhoid type                       No data recorded                        Medical Decision Making  Patient is a 29 y.o. male who presents to [facility] ED for Rectal Pain (Hx of hemorrhoids, pt states rectal pain x1 week). On initial ED evaluation, patient found to be in no acute distress. Per HPI,  concern to evaluate and treat for external/internal hemorrhoids.  RFED revealed no thrombosed hemorrhoids, or bleeding lesions of the rectum.  No bogginess noted in the anal canal.  Patient treated with topical 4% lidocaine cream in ED for symptom control.  Patient to be discharged on Anusol, Augmentin, Dermoplast for further symptom control.  Patient amenable to plan.  Patient vitally stable at this time.    Rx given for Dermoplast, Augmentin, Anusol. Patient to follow up with PCP outpatient, general surgery referral provided for further evaluation of hemorrhoids if needed. Anticipatory guidance and return precautions provided.  Patient otherwise stable for discharge.          Procedure  Procedures     Susan Moyer MD  Resident  07/23/24 7278    The patient was seen by the resident/fellow.  I have personally performed a substantive portion of the encounter.  I have seen and examined the patient; agree with the workup, evaluation, MDM, management and diagnosis.  The care plan has been discussed with the resident; I have reviewed the resident’s note and agree with the documented findings.       Colton Marquis,   07/29/24 0507

## 2024-08-02 ENCOUNTER — APPOINTMENT (OUTPATIENT)
Dept: PRIMARY CARE | Facility: CLINIC | Age: 29
End: 2024-08-02
Payer: COMMERCIAL

## 2024-08-20 ENCOUNTER — APPOINTMENT (OUTPATIENT)
Dept: PRIMARY CARE | Facility: CLINIC | Age: 29
End: 2024-08-20
Payer: COMMERCIAL

## 2024-09-27 ENCOUNTER — APPOINTMENT (OUTPATIENT)
Dept: PRIMARY CARE | Facility: CLINIC | Age: 29
End: 2024-09-27
Payer: COMMERCIAL

## 2024-09-27 VITALS
WEIGHT: 203 LBS | BODY MASS INDEX: 30.77 KG/M2 | SYSTOLIC BLOOD PRESSURE: 128 MMHG | HEIGHT: 68 IN | DIASTOLIC BLOOD PRESSURE: 84 MMHG

## 2024-09-27 DIAGNOSIS — F17.200 TOBACCO DEPENDENCY: Primary | ICD-10-CM

## 2024-09-27 DIAGNOSIS — Z00.00 HEALTHCARE MAINTENANCE: ICD-10-CM

## 2024-09-27 DIAGNOSIS — F32.A DEPRESSION, UNSPECIFIED DEPRESSION TYPE: ICD-10-CM

## 2024-09-27 NOTE — PROGRESS NOTES
"Subjective   Patient ID: Akira Matthews is a 29 y.o. male who presents for Follow-up.  HPI        past medical history of depression with suicidal ideation, vasectomy hemorrhoids, rectal bleeding internal hemorrhoids, genital herpes, heavy alcohol use, tobacco dependence,generalized muscle ache, and ADHD.  Anal fissure ,polysubstance abuse methamphetamine heroin   ER presentation July 27, 2020 for hemorrhoid given antibiotics Anusol secondary to anal fissure reported    Patient overall doing okay other than uncontrolled depression since age 12 and intermittent suicidal ideations with this grade 5 out of 10 he denies any present suicidal ideations or plans worse with substance abuse he admits to alcohol abuse cocaine tobacco abuse he states \"it is out of control \"          Health Maintenance:      Colonoscopy: 2023      Mammogram:      Pelvic/Pap:      Low dose chest CT:      Aorta duplex:      Optho:      Podiatry:        Vaccines:      Prevnar 20:      Prevnar 13:      Pneumovax 23:      Tdap:      Shingrix:      COVID:      Influenza:        ROS:      General: denies fever/chills/weight loss      Head: denies HA/trauma/masses/dizziness      Eyes: denies vision change/loss of vision/blurry vision/diplopia/eye pain      Ears:  denies hearing loss/tinnitus/otalgia/otorrhea      Nose: denies nasal drainage/anosmia      Throat:  denies dysphagia/odynophagia      Lymphatics: denies lymph node swelling      Cardiac: denies CP/palpitations/orthopnea/PND      Pulmonary: denies dyspnea/cough/wheezing      GI: States now having regular bowel movements with Metamucil once a day no further hematochezia denies abd pain/n/v/diarrhea/melena/hematochezia/hematemesis      : denies dysuria/hematuria/change frequency      Genital: denies genital discharge/lesions      Skin: Blood blister on his right palm callus formation getting better with time denies rashes/lesions/masses      MSK: denies weakness/swelling/edema/gait " "imbalance/pain      Neuro: denies paresthesias/seizures/dysarthria      Psych: Some stress because his wife is leaving him but he states the Lexapro really helps him 20 mg a day feeling better now he is living on his own some trouble completing task with hobbies history of ADHD decreased focus he believes he used to be on Concerta that helped ; uncontrolled severe depression denies depression/anxiety/suicidal or homicidal ideations            Objective   /84   Ht 1.727 m (5' 8\")   Wt 92.1 kg (203 lb)   BMI 30.87 kg/m²      Physical Exam:     General: AO3, NAD     Head: atraumatic/NC     Eyes: EOMI/PERRLA. Negative APD     Ears: TM pearly gray, EAC clear. No lesions or erythema     Nose: symmetric nares, no discharge     Throat: trachea midline, uvula midline pink mucosa. No thyromegaly     Lymphatics: no cervical/supraclavicular/ant or posterior cervical adenopathy/axillary/inguinal adenopathy     Breast: not examined     Chest: no deformity or tenderness to palpation     Pulm: CTA b/l, no wheeze/rhonchi/rales. nonlabored     Cardiac: RRR +s1s2, no m/r/g.      GI: soft, NT/ND. Normoactive Bsx4. No rebound/guarding.     Rectal: no examined     MSK: 5/5 strength UE LE. No edema/clubbing/cyanosis     Skin: 1 mm palmar blister left hand without tenderness no rashes/lesions     Vascular: 2+ palp DP PT radials b/l. Negative carotid bruit     Neuro: CNII-XII intact. No focal deficits. Reflexes 2/4 brachioradialis bicep tricep patellar achilles. Finger to nose intact.     Psych: appropriate mood/affect                    No results found for: \"BMPR1A\", \"CBCDIF\"    Patient refused audiology referral    Patient refused colorectal referral at this time  Assessment/Plan   Diagnoses and all orders for this visit:  Tobacco dependency  Depression, unspecified depression type  -     Referral to [facility]; Future  Healthcare maintenance  -     CBC and Auto Differential; Future  -     Comprehensive " Metabolic Panel; Future  -     Hemoglobin A1C; Future  -     Lipid Panel; Future  -     Thyroxine, Free; Future  -     Thyroid Stimulating Hormone; Future  -     Vitamin D 25 hydroxy; Future  -     C. trachomatis / N. gonorrhoeae, Amplified; Future  -     Hepatitis B Core Antibody, Igm; Future  -     Hepatitis B Surface Antigen; Future  -     Hepatitis B Surface Antibody; Future  -     Hepatitis C Antibody; Future  -     HIV 1/2 Antigen/Antibody Screen with Reflex to Confirmation; Future  -     Syphilis Screen with Reflex; Future    Strict cessation of alcohol tobacco and drugs  Call 911 for any suicidal thoughts or plans    Call and follow-up with GI  Maintain Metamucil once a day    Colorectal recommendations noted increase fiber    Dermatology recommendations noted appreciated I&D of back lesion    Screening blood work due November 2024    Thank you for making from today Akira    Please follow-up 3 months    Dipak Singh DO, NATHALIA Singh DO

## 2024-09-27 NOTE — PROGRESS NOTES
Tobacco Counseling  The patient smokes cigarettes and desires to quit.  We created the following quit plan:  Quit assistance referrals: none.

## 2024-09-27 NOTE — PATIENT INSTRUCTIONS
Quitting Smoking    Quitting smoking is the most important step you can take to improve your health. We're glad you have set a goal to improve your health.    Quit Smoking Resources    In addition to medications, use the STAR plan to help you successfully quit.   Stick with your quit date!   Tell friends, family, and coworkers your quit date. Request their understanding and support.  Anticipate and prepare for challenges. Some examples are withdrawal symptoms, being around others who smoke, and drinking alcohol.  Remove all tobacco products and paraphernalia from your environment. Make your home and vehicles smoke-free.    Free resources for additional support:  National tobacco quitline: 1-800-QUIT-NOW (1-327.335.4044).  SmokefreeTXT is a free text program to assist you in quitting. Visit https://www.smokefree.gov/smokefreetxt for more information.  Feel free to call your care manager at ([phone]) for additional support.

## 2024-11-18 DIAGNOSIS — F32.A DEPRESSION, UNSPECIFIED DEPRESSION TYPE: ICD-10-CM

## 2024-11-18 RX ORDER — ESCITALOPRAM OXALATE 20 MG/1
20 TABLET ORAL DAILY
Qty: 90 TABLET | Refills: 1 | Status: SHIPPED | OUTPATIENT
Start: 2024-11-18

## 2024-12-04 ENCOUNTER — OFFICE VISIT (OUTPATIENT)
Dept: URGENT CARE | Age: 29
End: 2024-12-04
Payer: COMMERCIAL

## 2024-12-04 VITALS
WEIGHT: 205 LBS | OXYGEN SATURATION: 100 % | RESPIRATION RATE: 20 BRPM | BODY MASS INDEX: 31.07 KG/M2 | SYSTOLIC BLOOD PRESSURE: 144 MMHG | HEIGHT: 68 IN | TEMPERATURE: 98.8 F | HEART RATE: 97 BPM | DIASTOLIC BLOOD PRESSURE: 93 MMHG

## 2024-12-04 DIAGNOSIS — R51.9 NONINTRACTABLE HEADACHE, UNSPECIFIED CHRONICITY PATTERN, UNSPECIFIED HEADACHE TYPE: ICD-10-CM

## 2024-12-04 DIAGNOSIS — R50.9 FEVER, UNSPECIFIED FEVER CAUSE: ICD-10-CM

## 2024-12-04 DIAGNOSIS — U07.1 COVID: Primary | ICD-10-CM

## 2024-12-04 DIAGNOSIS — R05.1 ACUTE COUGH: ICD-10-CM

## 2024-12-04 LAB
POC RAPID INFLUENZA A: NEGATIVE
POC RAPID INFLUENZA B: NEGATIVE
POC SARS-COV-2 AG BINAX: ABNORMAL

## 2024-12-04 ASSESSMENT — ENCOUNTER SYMPTOMS
ABDOMINAL PAIN: 0
WHEEZING: 0
FATIGUE: 1
RHINORRHEA: 1
CHILLS: 1
VOMITING: 0
TROUBLE SWALLOWING: 0
SHORTNESS OF BREATH: 0
SORE THROAT: 1
COUGH: 1
NAUSEA: 0
FEVER: 1

## 2024-12-04 NOTE — PROGRESS NOTES
Subjective   Patient ID: Akira Matthews is a 29 y.o. male. They present today with a chief complaint of Cough (Cold, coughing, sneezing, headache, body aches, fever.).    History of Present Illness  -c/o cough, sneezing, sinus/nasal congestion, fever, bodyaches since Saturday  -has known covid exposure  -denies difficulty breathing, SOB, CP, nausea, vomiting, abdominal pain   -slowly starting to feel better with OTC remedies   -denies history of COPD/asthma      Cough  Associated symptoms include chills, a fever, postnasal drip, rhinorrhea and a sore throat. Pertinent negatives include no chest pain, shortness of breath or wheezing.       Past Medical History  Allergies as of 12/04/2024    (No Known Allergies)       (Not in a hospital admission)       Past Medical History:   Diagnosis Date    Personal history of other infectious and parasitic diseases     History of herpes genitalis    Personal history of other mental and behavioral disorders     History of depression       Past Surgical History:   Procedure Laterality Date    OTHER SURGICAL HISTORY  05/09/2022    [place] tooth extraction    OTHER SURGICAL HISTORY  05/09/2022    [place] tooth extraction    OTHER SURGICAL HISTORY  05/09/2022    Tooth extraction    VASECTOMY      2023        reports that he has been smoking cigarettes and cigars. He uses smokeless tobacco. He reports current alcohol use of about 12.0 standard drinks of alcohol per week. He reports that he does not currently use drugs.    Review of Systems  Review of Systems   Constitutional:  Positive for chills, fatigue and fever.   HENT:  Positive for congestion, postnasal drip, rhinorrhea, sneezing and sore throat. Negative for trouble swallowing.    Respiratory:  Positive for cough. Negative for shortness of breath and wheezing.    Cardiovascular:  Negative for chest pain.   Gastrointestinal:  Negative for abdominal pain, nausea and vomiting.   All other systems reviewed and are negative.    Objective  "   Vitals:    12/04/24 1328   BP: (!) 144/93   Pulse: 97   Resp: 20   Temp: 37.1 °C (98.8 °F)   TempSrc: Oral   SpO2: 100%   Weight: 93 kg (205 lb)   Height: 1.727 m (5' 8\")     No LMP for male patient.    Physical Exam  GEN: Alert, cooperative, NAD, Vitals Reviewed.   ENT: Bilateral TMs normal, b/l ear canals unremarkable, + nasal congestion. No Sinus tenderness. Oropharyngeal erythema, b/l tonsils hyperemic but without exudate.  + post nasal drip.  Uvula midline. No uvula swelling.  No Cervical LAD.  RESP: Unlabored, no increased WOB, no accessory muscle use, no wheezing, rhonchi, or rales appreciated.   CARDS: RRR    Procedures    Point of Care Test & Imaging Results from this visit  Results for orders placed or performed in visit on 12/04/24   POCT Covid-19 Rapid Antigen   Result Value Ref Range    POC ERLINDA-COV-2 AG Positive test for SARS-CoV-2 (antigen detected) (A) Presumptive negative test for SARS-CoV-2 (no antigen detected)   POCT Influenza A/B manually resulted   Result Value Ref Range    POC Rapid Influenza A Negative Negative    POC Rapid Influenza B Negative Negative      No results found.    Diagnostic study results (if any) were reviewed by Radha Turpin PA-C.    Assessment/Plan   Allergies, medications, history, and pertinent labs/EKGs/Imaging reviewed by Radha Turpin PA-C.     Medical Decision Making  Clinical presentation consistent with viral URI due to COVID.  Rapid covid positive.  Rapid Flu negative.  No evidence of strep, pneumonia, otitis, bacterial sinusitis, other bacterial etiology, or sepsis.  Lack of antibiotic effectiveness discussed with patient/guardian.  Goal is symptom management with push fluids, rest, gargle warm salt water, use vaporizer or mist prn, apply heat to sinuses prn, and return office visit prn if symptoms persist or worsen anti-histamines, anti-tussives, anti-mucolytics, and/or flonase etc..  Call or return to clinic prn if these symptoms worsen or fail to " improve as anticipated.    At time of discharge patient was clinically well-appearing and HDS for outpatient management. The patient and/or family was educated regarding diagnosis, supportive care, OTC and Rx medications. The patient and/or family was given the opportunity to ask questions prior to discharge.  They verbalized understanding of my discussion of the plans for treatment, expected course, indications to return to  or seek further evaluation in ED, and the need for timely follow up as directed.  They were provided with a work/school excuse if requested.    Orders and Diagnoses  Diagnoses and all orders for this visit:  COVID  Acute cough  -     POCT Covid-19 Rapid Antigen  -     POCT Influenza A/B manually resulted  Fever, unspecified fever cause  -     POCT Covid-19 Rapid Antigen  -     POCT Influenza A/B manually resulted  Nonintractable headache, unspecified chronicity pattern, unspecified headache type  -     POCT Covid-19 Rapid Antigen  -     POCT Influenza A/B manually resulted      Medical Admin Record      Patient disposition: Home    Electronically signed by Radha Turpin PA-C  3:17 PM

## 2024-12-04 NOTE — LETTER
December 4, 2024     Patient: Akira Matthews   YOB: 1995   Date of Visit: 12/4/2024       To Whom It May Concern:    Akira Matthews was seen in my clinic on 12/4/2024 at 1:15 pm. Please excuse Akira for his absence from work on this day to make the appointment. May return on 12/09/2024.    If you have any questions or concerns, please don't hesitate to call.         Sincerely,         Radha Turpin PA-C        CC: No Recipients

## 2025-01-10 ENCOUNTER — LAB (OUTPATIENT)
Dept: LAB | Facility: LAB | Age: 30
End: 2025-01-10
Payer: COMMERCIAL

## 2025-01-10 ENCOUNTER — APPOINTMENT (OUTPATIENT)
Dept: PRIMARY CARE | Facility: CLINIC | Age: 30
End: 2025-01-10
Payer: COMMERCIAL

## 2025-01-10 VITALS — DIASTOLIC BLOOD PRESSURE: 80 MMHG | BODY MASS INDEX: 30.71 KG/M2 | SYSTOLIC BLOOD PRESSURE: 119 MMHG | WEIGHT: 202 LBS

## 2025-01-10 DIAGNOSIS — Z00.00 HEALTHCARE MAINTENANCE: ICD-10-CM

## 2025-01-10 DIAGNOSIS — E78.5 HYPERLIPIDEMIA, UNSPECIFIED HYPERLIPIDEMIA TYPE: ICD-10-CM

## 2025-01-10 DIAGNOSIS — F32.A DEPRESSION, UNSPECIFIED DEPRESSION TYPE: ICD-10-CM

## 2025-01-10 DIAGNOSIS — H60.92 OTITIS EXTERNA OF LEFT EAR, UNSPECIFIED CHRONICITY, UNSPECIFIED TYPE: ICD-10-CM

## 2025-01-10 DIAGNOSIS — R06.81 APNEA: Primary | ICD-10-CM

## 2025-01-10 LAB
25(OH)D3 SERPL-MCNC: 19 NG/ML (ref 30–100)
ALBUMIN SERPL BCP-MCNC: 4.6 G/DL (ref 3.4–5)
ALP SERPL-CCNC: 86 U/L (ref 33–120)
ALT SERPL W P-5'-P-CCNC: 65 U/L (ref 10–52)
ANION GAP SERPL CALC-SCNC: 12 MMOL/L (ref 10–20)
AST SERPL W P-5'-P-CCNC: 30 U/L (ref 9–39)
BASOPHILS # BLD AUTO: 0.04 X10*3/UL (ref 0–0.1)
BASOPHILS NFR BLD AUTO: 0.7 %
BILIRUB SERPL-MCNC: 1.2 MG/DL (ref 0–1.2)
BUN SERPL-MCNC: 12 MG/DL (ref 6–23)
CALCIUM SERPL-MCNC: 10.4 MG/DL (ref 8.6–10.6)
CHLORIDE SERPL-SCNC: 105 MMOL/L (ref 98–107)
CHOLEST SERPL-MCNC: 201 MG/DL (ref 0–199)
CHOLESTEROL/HDL RATIO: 4.4
CO2 SERPL-SCNC: 28 MMOL/L (ref 21–32)
CREAT SERPL-MCNC: 0.87 MG/DL (ref 0.5–1.3)
EGFRCR SERPLBLD CKD-EPI 2021: >90 ML/MIN/1.73M*2
EOSINOPHIL # BLD AUTO: 0.23 X10*3/UL (ref 0–0.7)
EOSINOPHIL NFR BLD AUTO: 4.2 %
ERYTHROCYTE [DISTWIDTH] IN BLOOD BY AUTOMATED COUNT: 12.7 % (ref 11.5–14.5)
EST. AVERAGE GLUCOSE BLD GHB EST-MCNC: 91 MG/DL
GLUCOSE SERPL-MCNC: 93 MG/DL (ref 74–99)
HBA1C MFR BLD: 4.8 %
HBV CORE IGM SER QL: NONREACTIVE
HBV SURFACE AB SER-ACNC: <3.1 MIU/ML
HBV SURFACE AG SERPL QL IA: NONREACTIVE
HCT VFR BLD AUTO: 47.9 % (ref 41–52)
HCV AB SER QL: NONREACTIVE
HDLC SERPL-MCNC: 45.7 MG/DL
HGB BLD-MCNC: 16.4 G/DL (ref 13.5–17.5)
HIV 1+2 AB+HIV1 P24 AG SERPL QL IA: NONREACTIVE
IMM GRANULOCYTES # BLD AUTO: 0.02 X10*3/UL (ref 0–0.7)
IMM GRANULOCYTES NFR BLD AUTO: 0.4 % (ref 0–0.9)
LDLC SERPL CALC-MCNC: 111 MG/DL
LYMPHOCYTES # BLD AUTO: 1.68 X10*3/UL (ref 1.2–4.8)
LYMPHOCYTES NFR BLD AUTO: 30.3 %
MCH RBC QN AUTO: 31 PG (ref 26–34)
MCHC RBC AUTO-ENTMCNC: 34.2 G/DL (ref 32–36)
MCV RBC AUTO: 91 FL (ref 80–100)
MONOCYTES # BLD AUTO: 0.43 X10*3/UL (ref 0.1–1)
MONOCYTES NFR BLD AUTO: 7.8 %
NEUTROPHILS # BLD AUTO: 3.14 X10*3/UL (ref 1.2–7.7)
NEUTROPHILS NFR BLD AUTO: 56.6 %
NON HDL CHOLESTEROL: 155 MG/DL (ref 0–149)
NRBC BLD-RTO: 0 /100 WBCS (ref 0–0)
PLATELET # BLD AUTO: 301 X10*3/UL (ref 150–450)
POTASSIUM SERPL-SCNC: 4.2 MMOL/L (ref 3.5–5.3)
PROT SERPL-MCNC: 7.9 G/DL (ref 6.4–8.2)
RBC # BLD AUTO: 5.29 X10*6/UL (ref 4.5–5.9)
SODIUM SERPL-SCNC: 141 MMOL/L (ref 136–145)
T4 FREE SERPL-MCNC: 1.15 NG/DL (ref 0.78–1.48)
TREPONEMA PALLIDUM IGG+IGM AB [PRESENCE] IN SERUM OR PLASMA BY IMMUNOASSAY: NONREACTIVE
TRIGL SERPL-MCNC: 221 MG/DL (ref 0–149)
TSH SERPL-ACNC: 0.74 MIU/L (ref 0.44–3.98)
VLDL: 44 MG/DL (ref 0–40)
WBC # BLD AUTO: 5.5 X10*3/UL (ref 4.4–11.3)

## 2025-01-10 PROCEDURE — 80053 COMPREHEN METABOLIC PANEL: CPT

## 2025-01-10 PROCEDURE — 83036 HEMOGLOBIN GLYCOSYLATED A1C: CPT

## 2025-01-10 PROCEDURE — 99215 OFFICE O/P EST HI 40 MIN: CPT | Performed by: INTERNAL MEDICINE

## 2025-01-10 PROCEDURE — 87340 HEPATITIS B SURFACE AG IA: CPT

## 2025-01-10 PROCEDURE — 87591 N.GONORRHOEAE DNA AMP PROB: CPT

## 2025-01-10 PROCEDURE — 86803 HEPATITIS C AB TEST: CPT

## 2025-01-10 PROCEDURE — 87491 CHLMYD TRACH DNA AMP PROBE: CPT

## 2025-01-10 PROCEDURE — 87389 HIV-1 AG W/HIV-1&-2 AB AG IA: CPT

## 2025-01-10 PROCEDURE — 85025 COMPLETE CBC W/AUTO DIFF WBC: CPT

## 2025-01-10 PROCEDURE — 84443 ASSAY THYROID STIM HORMONE: CPT

## 2025-01-10 PROCEDURE — 86705 HEP B CORE ANTIBODY IGM: CPT

## 2025-01-10 PROCEDURE — 84439 ASSAY OF FREE THYROXINE: CPT

## 2025-01-10 PROCEDURE — 82306 VITAMIN D 25 HYDROXY: CPT

## 2025-01-10 PROCEDURE — 80061 LIPID PANEL: CPT

## 2025-01-10 PROCEDURE — 86706 HEP B SURFACE ANTIBODY: CPT

## 2025-01-10 PROCEDURE — 86780 TREPONEMA PALLIDUM: CPT

## 2025-01-10 RX ORDER — CIPROFLOXACIN AND DEXAMETHASONE 3; 1 MG/ML; MG/ML
4 SUSPENSION/ DROPS AURICULAR (OTIC) 2 TIMES DAILY
Qty: 7.5 ML | Refills: 2 | Status: SHIPPED | OUTPATIENT
Start: 2025-01-10 | End: 2025-01-17

## 2025-01-10 RX ORDER — ESCITALOPRAM OXALATE 20 MG/1
20 TABLET ORAL DAILY
Qty: 90 TABLET | Refills: 1 | Status: SHIPPED | OUTPATIENT
Start: 2025-01-10
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
## 2025-01-11 LAB
C TRACH RRNA SPEC QL NAA+PROBE: NEGATIVE
N GONORRHOEA DNA SPEC QL PROBE+SIG AMP: NEGATIVE

## 2025-01-27 DIAGNOSIS — R74.01 ELEVATED ALT MEASUREMENT: ICD-10-CM

## 2025-01-27 DIAGNOSIS — E55.9 VITAMIN D DEFICIENCY: Primary | ICD-10-CM

## 2025-01-27 RX ORDER — VIT C/E/ZN/COPPR/LUTEIN/ZEAXAN 250MG-90MG
25 CAPSULE ORAL DAILY
Qty: 90 CAPSULE | Refills: 1 | Status: SHIPPED | OUTPATIENT
Start: 2025-01-27 | End: 2025-04-27

## 2025-03-14 ENCOUNTER — OFFICE VISIT (OUTPATIENT)
Dept: PRIMARY CARE | Facility: CLINIC | Age: 30
End: 2025-03-14
Payer: COMMERCIAL

## 2025-03-14 VITALS — WEIGHT: 200 LBS | SYSTOLIC BLOOD PRESSURE: 114 MMHG | DIASTOLIC BLOOD PRESSURE: 84 MMHG | BODY MASS INDEX: 30.41 KG/M2

## 2025-03-14 DIAGNOSIS — F32.A DEPRESSION, UNSPECIFIED DEPRESSION TYPE: ICD-10-CM

## 2025-03-14 DIAGNOSIS — K64.9 HEMORRHOIDS, UNSPECIFIED HEMORRHOID TYPE: Primary | ICD-10-CM

## 2025-03-14 PROCEDURE — 99213 OFFICE O/P EST LOW 20 MIN: CPT | Performed by: INTERNAL MEDICINE

## 2025-03-14 RX ORDER — ESCITALOPRAM OXALATE 20 MG/1
20 TABLET ORAL DAILY
Qty: 90 TABLET | Refills: 1 | Status: SHIPPED | OUTPATIENT
Start: 2025-03-14
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
## 2025-04-11 ENCOUNTER — APPOINTMENT (OUTPATIENT)
Dept: PRIMARY CARE | Facility: CLINIC | Age: 30
End: 2025-04-11
Payer: COMMERCIAL

## 2025-07-29 ENCOUNTER — TELEPHONE (OUTPATIENT)
Dept: PRIMARY CARE | Facility: CLINIC | Age: 30
End: 2025-07-29

## 2025-07-29 ENCOUNTER — HOSPITAL ENCOUNTER (OUTPATIENT)
Dept: RADIOLOGY | Facility: CLINIC | Age: 30
End: 2025-07-29
Payer: COMMERCIAL

## 2025-07-29 ENCOUNTER — APPOINTMENT (OUTPATIENT)
Dept: PRIMARY CARE | Facility: CLINIC | Age: 30
End: 2025-07-29
Payer: COMMERCIAL

## 2025-07-29 ENCOUNTER — APPOINTMENT (OUTPATIENT)
Dept: RADIOLOGY | Facility: CLINIC | Age: 30
End: 2025-07-29
Payer: COMMERCIAL

## 2025-07-29 VITALS — SYSTOLIC BLOOD PRESSURE: 132 MMHG | DIASTOLIC BLOOD PRESSURE: 85 MMHG | WEIGHT: 196 LBS | BODY MASS INDEX: 29.8 KG/M2

## 2025-07-29 DIAGNOSIS — S00.81XS ABRASION OF FACE, SEQUELA: ICD-10-CM

## 2025-07-29 DIAGNOSIS — M25.572 ACUTE LEFT ANKLE PAIN: ICD-10-CM

## 2025-07-29 DIAGNOSIS — M25.562 ACUTE PAIN OF LEFT KNEE: Primary | ICD-10-CM

## 2025-07-29 PROCEDURE — 99215 OFFICE O/P EST HI 40 MIN: CPT | Performed by: INTERNAL MEDICINE

## 2025-07-29 RX ORDER — TIZANIDINE 2 MG/1
2 TABLET ORAL 3 TIMES DAILY PRN
Qty: 90 TABLET | Refills: 0 | Status: SHIPPED | OUTPATIENT
Start: 2025-07-29 | End: 2025-08-28

## 2025-07-29 RX ORDER — NAPROXEN 500 MG/1
500 TABLET ORAL 2 TIMES DAILY PRN
Qty: 10 TABLET | Refills: 2 | Status: SHIPPED | OUTPATIENT
Start: 2025-07-29 | End: 2025-08-03

## 2025-07-29 NOTE — LETTER
July 29, 2025     Patient: Akira Matthews   YOB: 1995   Date of Visit: 7/29/2025       To Whom It May Concern:    Akira Matthews was seen in my clinic on 7/29/2025 at 1:30 pm. Please excuse from work for 1 week then can return without restrictions if better.          Sincerely,         Dipak Singh,         CC: No Recipients
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
## 2025-08-03 ENCOUNTER — OFFICE VISIT (OUTPATIENT)
Dept: URGENT CARE | Age: 30
End: 2025-08-03
Payer: COMMERCIAL

## 2025-08-03 VITALS
HEIGHT: 68 IN | SYSTOLIC BLOOD PRESSURE: 133 MMHG | WEIGHT: 196 LBS | HEART RATE: 93 BPM | DIASTOLIC BLOOD PRESSURE: 83 MMHG | BODY MASS INDEX: 29.7 KG/M2 | RESPIRATION RATE: 20 BRPM | OXYGEN SATURATION: 98 %

## 2025-08-03 DIAGNOSIS — S01.81XA FACIAL LACERATION, INITIAL ENCOUNTER: Primary | ICD-10-CM

## 2025-08-03 PROCEDURE — 99213 OFFICE O/P EST LOW 20 MIN: CPT | Performed by: FAMILY MEDICINE

## 2025-08-03 PROCEDURE — 3008F BODY MASS INDEX DOCD: CPT | Performed by: FAMILY MEDICINE
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
## 2025-08-13 ENCOUNTER — OFFICE VISIT (OUTPATIENT)
Dept: URGENT CARE | Age: 30
End: 2025-08-13
Payer: COMMERCIAL

## 2025-08-13 VITALS
RESPIRATION RATE: 16 BRPM | HEART RATE: 82 BPM | SYSTOLIC BLOOD PRESSURE: 117 MMHG | TEMPERATURE: 96.8 F | OXYGEN SATURATION: 97 % | DIASTOLIC BLOOD PRESSURE: 74 MMHG

## 2025-08-13 DIAGNOSIS — J02.9 SORE THROAT: Primary | ICD-10-CM

## 2025-08-13 DIAGNOSIS — J02.9 PHARYNGITIS, UNSPECIFIED ETIOLOGY: ICD-10-CM

## 2025-08-13 LAB
POC HUMAN RHINOVIRUS PCR: NEGATIVE
POC INFLUENZA A VIRUS PCR: NEGATIVE
POC INFLUENZA B VIRUS PCR: NEGATIVE
POC RESPIRATORY SYNCYTIAL VIRUS PCR: NEGATIVE
POC STREPTOCOCCUS PYOGENES (GROUP A STREP) PCR: NEGATIVE

## 2025-08-13 RX ORDER — METHYLPREDNISOLONE 4 MG/1
TABLET ORAL
Qty: 21 TABLET | Refills: 0 | Status: SHIPPED | OUTPATIENT
Start: 2025-08-13 | End: 2025-08-19

## 2025-08-13 ASSESSMENT — ENCOUNTER SYMPTOMS
TROUBLE SWALLOWING: 0
FEVER: 0
SORE THROAT: 1
COUGH: 0

## 2025-10-03 ENCOUNTER — APPOINTMENT (OUTPATIENT)
Dept: PRIMARY CARE | Facility: CLINIC | Age: 30
End: 2025-10-03
Payer: COMMERCIAL